# Patient Record
Sex: FEMALE | Race: WHITE | NOT HISPANIC OR LATINO | Employment: FULL TIME | URBAN - METROPOLITAN AREA
[De-identification: names, ages, dates, MRNs, and addresses within clinical notes are randomized per-mention and may not be internally consistent; named-entity substitution may affect disease eponyms.]

---

## 2022-08-23 ENCOUNTER — LAB REQUISITION (OUTPATIENT)
Dept: LAB | Facility: CLINIC | Age: 42
End: 2022-08-23
Payer: COMMERCIAL

## 2022-08-23 DIAGNOSIS — Z11.59 ENCOUNTER FOR SCREENING FOR OTHER VIRAL DISEASES: ICD-10-CM

## 2022-08-23 DIAGNOSIS — Z91.89 OTHER SPECIFIED PERSONAL RISK FACTORS, NOT ELSEWHERE CLASSIFIED: ICD-10-CM

## 2022-08-23 DIAGNOSIS — Z11.4 ENCOUNTER FOR SCREENING FOR HUMAN IMMUNODEFICIENCY VIRUS (HIV): ICD-10-CM

## 2022-08-23 DIAGNOSIS — A53.9 SYPHILIS, UNSPECIFIED: ICD-10-CM

## 2022-08-23 PROCEDURE — 87389 HIV-1 AG W/HIV-1&-2 AB AG IA: CPT | Mod: ORL | Performed by: OBSTETRICS & GYNECOLOGY

## 2022-08-23 PROCEDURE — 87491 CHLMYD TRACH DNA AMP PROBE: CPT | Mod: ORL | Performed by: OBSTETRICS & GYNECOLOGY

## 2022-08-23 PROCEDURE — 86592 SYPHILIS TEST NON-TREP QUAL: CPT | Mod: ORL | Performed by: OBSTETRICS & GYNECOLOGY

## 2022-08-23 PROCEDURE — 87591 N.GONORRHOEAE DNA AMP PROB: CPT | Mod: ORL | Performed by: OBSTETRICS & GYNECOLOGY

## 2022-08-23 PROCEDURE — 87340 HEPATITIS B SURFACE AG IA: CPT | Mod: ORL | Performed by: OBSTETRICS & GYNECOLOGY

## 2022-08-23 PROCEDURE — 86803 HEPATITIS C AB TEST: CPT | Mod: ORL | Performed by: OBSTETRICS & GYNECOLOGY

## 2022-08-24 LAB
C TRACH DNA SPEC QL PROBE+SIG AMP: NEGATIVE
HBV SURFACE AG SERPL QL IA: NONREACTIVE
HCV AB SERPL QL IA: NONREACTIVE
HIV 1+2 AB+HIV1 P24 AG SERPL QL IA: NONREACTIVE
N GONORRHOEA DNA SPEC QL NAA+PROBE: NEGATIVE
RPR SER QL: NONREACTIVE

## 2024-04-22 ENCOUNTER — TRANSCRIBE ORDERS (OUTPATIENT)
Dept: OTHER | Age: 44
End: 2024-04-22

## 2024-04-22 ENCOUNTER — PATIENT OUTREACH (OUTPATIENT)
Dept: ONCOLOGY | Facility: CLINIC | Age: 44
End: 2024-04-22
Payer: COMMERCIAL

## 2024-04-22 DIAGNOSIS — I89.0 LYMPHEDEMA: Primary | ICD-10-CM

## 2024-04-22 NOTE — PROGRESS NOTES
Lilyr received referral for Dr De Paz in the PM&R clinic.     Referred for: lymphovenous anastomosis procedure of her left leg for history of multiple melanomas with chronic lymphedema     Scheduling instructions updated and sent to New Patient Scheduling for completion.

## 2024-06-25 ENCOUNTER — ONCOLOGY VISIT (OUTPATIENT)
Dept: ONCOLOGY | Facility: CLINIC | Age: 44
End: 2024-06-25
Attending: SURGERY
Payer: COMMERCIAL

## 2024-06-25 VITALS
HEART RATE: 69 BPM | RESPIRATION RATE: 16 BRPM | SYSTOLIC BLOOD PRESSURE: 105 MMHG | WEIGHT: 125 LBS | BODY MASS INDEX: 20.83 KG/M2 | HEIGHT: 65 IN | TEMPERATURE: 97.8 F | OXYGEN SATURATION: 99 % | DIASTOLIC BLOOD PRESSURE: 68 MMHG

## 2024-06-25 DIAGNOSIS — I89.0 LYMPHEDEMA: ICD-10-CM

## 2024-06-25 DIAGNOSIS — C43.9 MELANOMA OF SKIN (H): Primary | ICD-10-CM

## 2024-06-25 PROCEDURE — 99205 OFFICE O/P NEW HI 60 MIN: CPT | Performed by: STUDENT IN AN ORGANIZED HEALTH CARE EDUCATION/TRAINING PROGRAM

## 2024-06-25 PROCEDURE — 99213 OFFICE O/P EST LOW 20 MIN: CPT | Performed by: STUDENT IN AN ORGANIZED HEALTH CARE EDUCATION/TRAINING PROGRAM

## 2024-06-25 PROCEDURE — 99417 PROLNG OP E/M EACH 15 MIN: CPT | Performed by: STUDENT IN AN ORGANIZED HEALTH CARE EDUCATION/TRAINING PROGRAM

## 2024-06-25 RX ORDER — MOMETASONE FUROATE 1 MG/ML
SOLUTION TOPICAL
COMMUNITY
Start: 2023-07-12

## 2024-06-25 RX ORDER — LEVONORGESTREL/ETHIN.ESTRADIOL 0.1-0.02MG
1 TABLET ORAL
COMMUNITY
Start: 2023-08-10

## 2024-06-25 RX ORDER — FERROUS SULFATE 7.5 MG/0.5
SYRINGE (EA) ORAL
COMMUNITY

## 2024-06-25 RX ORDER — VITAMIN B COMPLEX
1 CAPSULE ORAL DAILY
COMMUNITY

## 2024-06-25 RX ORDER — ESCITALOPRAM OXALATE 5 MG/1
1 TABLET ORAL DAILY
COMMUNITY
Start: 2022-07-29

## 2024-06-25 RX ORDER — ADAPALENE GEL USP, 0.3% 3 MG/G
GEL TOPICAL 2 TIMES DAILY
COMMUNITY
Start: 2022-12-26

## 2024-06-25 RX ORDER — AZELAIC ACID 0.15 G/G
GEL TOPICAL
COMMUNITY
Start: 2022-12-26

## 2024-06-25 RX ORDER — CLINDAMYCIN PHOSPHATE 10 UG/ML
LOTION TOPICAL
COMMUNITY
Start: 2023-11-16

## 2024-06-25 ASSESSMENT — PAIN SCALES - GENERAL: PAINLEVEL: NO PAIN (0)

## 2024-06-25 NOTE — PATIENT INSTRUCTIONS
It was very nice to meet you today.    1.  An order for a flexitouch lymphedema pump was placed today. We will be in touch as the process progresses.  2.  Proceed with obtaining custom compression garments at your upcoming scheduled orthotics visit with Carmen.  3. Continue working  with lymphedema therapy.  4. Continue daily compression and your home lymphedema exercise routine as tolerated.  5. Follow up with Dr. De Paz in 4-6 months.

## 2024-06-25 NOTE — NURSING NOTE
"Oncology Rooming Note    June 25, 2024 1:04 PM   Milagros Eric is a 43 year old female who presents for:    Chief Complaint   Patient presents with    Oncology Clinic Visit     Initial Vitals: /68   Pulse 69   Temp 97.8  F (36.6  C) (Oral)   Resp 16   Ht 1.651 m (5' 5\")   Wt 56.7 kg (125 lb)   SpO2 99%   BMI 20.80 kg/m   Estimated body mass index is 20.8 kg/m  as calculated from the following:    Height as of this encounter: 1.651 m (5' 5\").    Weight as of this encounter: 56.7 kg (125 lb). Body surface area is 1.61 meters squared.  No Pain (0) Comment: Data Unavailable   No LMP recorded.  Allergies reviewed: Yes  Medications reviewed: Yes    Medications: Medication refills not needed today.  Pharmacy name entered into EPIC:    [id number] - [place], [place] - [address] [facility] AND [address] [id number] - [place], [place] - [address] AT  [address] [facility] [place]    Frailty Screening:   Is the patient here for a new oncology consult visit in cancer care? 2. No      Clinical concerns: follow up        Ann Espinoza            "

## 2024-06-25 NOTE — PROGRESS NOTES
PM&R Clinic Note     Patient Name: Milagros Eric : 1980 Medical Record: [id number]     Requesting Physician/clinician: Moni Sears MD           History of Present Illness:     Milagros Eric is a 43 year old female with history of multiple thin melanomas over bilateral lower extremities since  and left posterior thigh primary melanoma most recently who presents to PM&R cancer rehab clinic for evaluation of her needs in the setting of chronic left lower extremity lymphedema.    Oncology history:  -2013-medial left upper calf/lower knee 0.6 mm thick melanoma-hyperpigmented  - 2019: Right mid lateral calf, MIS  - Followed by Dr. Max Mancilla previously with [facility] and [place].  -Underwent WLE of left posterior thigh primary with negative sentinel lymph node biopsy most recently.  - Was advised in  that she did not meet criteria for adjuvant therapy even when considering later approvals for high risk stage II disease.  - Was continuing with self monitoring and dermatology follow-up with frequent biopsy of suspicious lesions and no subsequent findings of melanoma.  Course was complicated by left lower extremity lymphedema.  - Chronic left lower extremity lymphedema with significant quality of life impairment.  Presently managed with lymphedema pump and daily compression stocking use.  Has consulted with specialist at [place], [facility], [facility] and [facility] regarding surgical intervention including lymph node transplant and lymphovenous anastomosis.  Originally had planned to undergoing lymphovenous anastomosis at [facility].  Studies that were done suggested an element of primary lymphedema in all 4 limbs.  -Had lymphovenous anastomosis of the left leg on 23 at the [facility].  Last follow-up with plastic surgery was virtually on 2024.  Patient reported that they were managing swelling with a 30 to 40 mmHg  flat knit garment, pneumatic compression pump and lymphedema therapy.  She is 6 months out from left leg LVA and reports decreased swelling in submental region and bilateral hands and abdomen and in left leg.  Also reported not refilling as quickly when out of the garment.  Reports that tendons/veins and left foot are more visible.  Currently wearing circular thigh-high compression.  ICG limb for graft feet not directly comparable but showed significant improvement in left lower extremity edema.  Overall doing very well.  Continue with strict compression protocol to maintain optimal containment.  Pneumatic compression pump.  Plan for return to clinic in December 2024.  Plastic surgeon is Dr. Juan Carlos Staton.        Symptoms,  Milagros was seen for an initial evaluation.  Initially swelling developed a few weeks after her left posterior thigh excision in calf. She was seeing lymphedema therapy and wearing knee high compression, then it was stable until 2022 when she had to increase compression to thigh high and increase frequency of lymphedema therapy because of worsened swelling in LLE. In Sept of 2022, she went to a spin class and then in the shower, noticed swelling in her right calf. She wears compression for her RLE.  She has been following with lymphedema therapy.  She does not feel like she has had much physical reduction, though she has had improvement in her left foot and also has noticed improvement in swelling in her face/neck and hands.   She obtains compression garments through [facility] and has circular knit. She needs flat knit garments post op after her LVA at [facility] per surgeon.  She had LVA on foot and leg. She feels the flat knit is pushing on the foot, and she needs to be careful with site.   She has made an appointment with Carmen at [place].   She does not have open wounds in any area of her skin in the body. She is worried about cellulitis, but has not developed it.   In terms of her  lymphedema pump, the surgeon at [facility] suggested a lymphedema pump. She was given a used Carson pump. She had to use an entre pump, and tried for a flexitouch pump and she was denied a pump and the appeal was denied. She obtained a pump from the patient, and it stopped working in trunk and left leg, so she got parts/fixed. She is using 2 used pumps (Carson and flexitouch).       Therapies/HEP,  Continuing with lymphedema therapy, Carolyn Kowalski, through [facility].      Functionally,   Independent with mobility, ADLs and IADLs.             Past Medical and Surgical History:     Past Medical History:   Diagnosis Date    ADHD (attention deficit hyperactivity disorder)     Anxiety     PMS (premenstrual syndrome)     Recurrent UTI     Scoliosis      Past Surgical History:   Procedure Laterality Date    scoliosis surgery  1993            Social History:     Social History     Tobacco Use    Smoking status: Never     Passive exposure: Never    Smokeless tobacco: Never   Substance Use Topics    Alcohol use: Yes     Alcohol/week: 10.0 standard drinks of alcohol     Types: 12 drink(s) per week     Living situation: [place], [place]  Vocational History: Works in finance, corporate real estate, travels frequently for job between [place]  and [facility]         Functional history:     Milagros Eric is independent with all aspects of her life.    ADLs: Independent  Assistive devices: none  iADLs (medication management and finances): Independent           Family History:     History reviewed. No pertinent family history.         Medications:     Current Outpatient Medications   Medication Sig Dispense Refill    cyanocobalamin (B-12) 100 MCG TABS Take 1,000 mcg by mouth daily.      fish oil-omega-3 fatty acids (FISH OIL) 1000 MG capsule Take 2 g by mouth daily.      MULTIPLE VITAMIN PO Take 1 tablet by mouth daily.              Allergies:     No Known Allergies           ROS:     A focused ROS is negative other than the symptoms noted above  "in the HPI.         Physical Examiniation:     VITAL SIGNS: /68   Pulse 69   Temp 97.8  F (36.6  C) (Oral)   Resp 16   Ht 1.651 m (5' 5\")   Wt 56.7 kg (125 lb)   SpO2 99%   BMI 20.80 kg/m    BMI: Estimated body mass index is 20.8 kg/m  as calculated from the following:    Height as of this encounter: 1.651 m (5' 5\").    Weight as of this encounter: 56.7 kg (125 lb).    Gen: NAD, pleasant and cooperative  HEENT: Normocephalic, atraumatic, extra-ocular movements appear intact  Pulm: non-labored breathing in room air  Ext: no edema in BLE, no tenderness in calves  Neuro/MSK:   Orientation: Oriented to person, place, time, situation. Exhibits good insight into his/her condition and ongoing management/symptoms.  Motor: Moving all 4 extremities actively against gravity.  Bilateral lower extremity lymphedema exam:   Notable palpable lymphedema on inspection in bilateral lower extremities L>R, positive Stemmer's sign bilaterally. Surgical scars well healed along left lower extremity.           Laboratory/Imaging:     MA Screening Bilateral W/ [facility] (4/22/24):  FINDINGS: Bilateral screening mammogram was performed with the assistance   of Computer-Aided Detection and breast tomosynthesis. The breasts are   extremely dense, which lowers the sensitivity of mammography.            Assessment/Plan:   Milagros Eric is a 43 year old female with history of multiple thin melanomas over bilateral lower extremities since 2013 and left posterior thigh primary melanoma most recently who presents to PM&R cancer rehab clinic for evaluation of her needs in the setting of chronic left lower extremity lymphedema.  Multiple rehabilitation considerations were discussed with Milagros at today's visit. She is here to establish care for ongoing lymphedema management. We reviewed patient education surrounding lymphedema management in the setting of her history. She would strongly benefit from a flexitouch lymphedema pump for ongoing " management of her bilateral lower extremity lymphedema. Order was placed today. She should proceed to orthotics as planned for fitting for custom compression garments. Patient is having an area of exacerbated swelling/pressure with her current garments which are close to one of her surgical sites and would benefit from new custom compression. She should continue her home regimen and working with lymphedema therapy. We will plan a return visit in 4-6 months. She is in agreement with this plan.      Patient education: In depth discussion and education was provided about the assessment and implications of each of the below recommendations for management. Patient indicated readiness to learn, all questions were answered and understanding of material presented was confirmed.  Therapy/equipment/braces:  Continue working with lymphedema therapy for ongoing management.  Continue with daily compression and keep scheduled appointment for custom compression fitting.    Patient would benefit from a lymphedema pump, so order was placed at today's visit. Milagros Wiseman a patient who I have seen for bilateral lower extremity swelling due to secondary lymphedema.  They have had aggressive treatment including education, elevation, medications, exercise, therapy, massage, bandaging and compression garments. Milagros Marcanos been compliant with the program for swelling, but despite this continues to have problems with continued swelling, scarring, fibrosis, hyperkeratosis, hyperplasia and decreased functional mobility. A trial of a basic / pump has been done without significant benefit.  It is my medical opinion an advanced lymphatic pump is required. Order placed for flexitouch lymphedema pump. I am placing an order for a Flexitouch lymphatic pump.   Referral / follow up with other providers:  Continue follow up with plastic surgery team.  Follow up: 4-6 months.    Samira De Paz MD  Physical Medicine &  Rehabilitation    I appreciate the opportunity to participate in the care of your patient.     90 minutes spent on the date of the encounter doing chart review, history and exam, documentation and further activities as noted above.

## 2024-06-25 NOTE — LETTER
2024      Milagros Eric  [address]  [facility] [address]      Dear Colleague,    Thank you for referring your patient, Milagros Eric, to the [facility]. Please see a copy of my visit note below.           PM&R Clinic Note     Patient Name: Milagros Eric : 1980 Medical Record: [id number]     Requesting Physician/clinician: Moni Sears MD           History of Present Illness:     Milagros Eric is a 43 year old female with history of multiple thin melanomas over bilateral lower extremities since  and left posterior thigh primary melanoma most recently who presents to PM&R cancer rehab clinic for evaluation of her needs in the setting of chronic left lower extremity lymphedema.    Oncology history:  -2013-medial left upper calf/lower knee 0.6 mm thick melanoma-hyperpigmented  - 2019: Right mid lateral calf, MIS  - Followed by Dr. Max Mancilla previously with [facility] and [place].  -Underwent WLE of left posterior thigh primary with negative sentinel lymph node biopsy most recently.  - Was advised in  that she did not meet criteria for adjuvant therapy even when considering later approvals for high risk stage II disease.  - Was continuing with self monitoring and dermatology follow-up with frequent biopsy of suspicious lesions and no subsequent findings of melanoma.  Course was complicated by left lower extremity lymphedema.  - Chronic left lower extremity lymphedema with significant quality of life impairment.  Presently managed with lymphedema pump and daily compression stocking use.  Has consulted with specialist at [place], [facility] including lymph node transplant and lymphovenous anastomosis.  Originally had planned to undergoing lymphovenous anastomosis at [facility].  Studies that were done suggested an element of  primary lymphedema in all 4 limbs.  -Had lymphovenous anastomosis of the left leg on 12/27/23 at the [facility].  Last follow-up with plastic surgery was virtually on 6/11/2024.  Patient reported that they were managing swelling with a 30 to 40 mmHg flat knit garment, pneumatic compression pump and lymphedema therapy.  She is 6 months out from left leg LVA and reports decreased swelling in submental region and bilateral hands and abdomen and in left leg.  Also reported not refilling as quickly when out of the garment.  Reports that tendons/veins and left foot are more visible.  Currently wearing circular thigh-high compression.  ICG limb for graft feet not directly comparable but showed significant improvement in left lower extremity edema.  Overall doing very well.  Continue with strict compression protocol to maintain optimal containment.  Pneumatic compression pump.  Plan for return to clinic in December 2024.  Plastic surgeon is Dr. Juan Carlos Staton.        Milagros Saini was seen for an initial evaluation.  Initially swelling developed a few weeks after her left posterior thigh excision in calf. She was seeing lymphedema therapy and wearing knee high compression, then it was stable until 2022 when she had to increase compression to thigh high and increase frequency of lymphedema therapy because of worsened swelling in LLE. In Sept of 2022, she went to a spin class and then in the shower, noticed swelling in her right calf. She wears compression for her RLE.  She has been following with lymphedema therapy.  She does not feel like she has had much physical reduction, though she has had improvement in her left foot and also has noticed improvement in swelling in her face/neck and hands.   She obtains compression garments through [facility] and has circular knit. She needs flat knit garments post op after her LVA at [facility] per surgeon.  She had LVA on foot and leg. She feels the flat knit is pushing  on the foot, and she needs to be careful with site.   She has made an appointment with Carmen at [place].   She does not have open wounds in any area of her skin in the body. She is worried about cellulitis, but has not developed it.   In terms of her lymphedema pump, the surgeon at [facility] suggested a lymphedema pump. She was given a used Carson pump. She had to use an entre pump, and tried for a flexitouch pump and she was denied a pump and the appeal was denied. She obtained a pump from the patient, and it stopped working in trunk and left leg, so she got parts/fixed. She is using 2 used pumps (Carson and flexitouch).       Therapies/HEP,  Continuing with lymphedema therapy, Carolyn Kowalski, through [facility].      Functionally,   Independent with mobility, ADLs and IADLs.             Past Medical and Surgical History:     Past Medical History:   Diagnosis Date     ADHD (attention deficit hyperactivity disorder)      Anxiety      PMS (premenstrual syndrome)      Recurrent UTI      Scoliosis      Past Surgical History:   Procedure Laterality Date     scoliosis surgery  1993            Social History:     Social History     Tobacco Use     Smoking status: Never     Passive exposure: Never     Smokeless tobacco: Never   Substance Use Topics     Alcohol use: Yes     Alcohol/week: 10.0 standard drinks of alcohol     Types: 12 drink(s) per week     Living situation: [place], [place]  Vocational History: Works in finance, corporate real estate, 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    ADLs: Independent  Assistive devices: none  iADLs (medication management and finances): Independent           Family History:     History reviewed. No pertinent family history.         Medications:     Current Outpatient Medications   Medication Sig Dispense Refill     cyanocobalamin (B-12) 100 MCG TABS Take 1,000 mcg by mouth daily.        "fish oil-omega-3 fatty acids (FISH OIL) 1000 MG capsule Take 2 g by mouth daily.       MULTIPLE VITAMIN PO Take 1 tablet by mouth daily.              Allergies:     No Known Allergies           ROS:     A focused ROS is negative other than the symptoms noted above in the HPI.         Physical Examiniation:     VITAL SIGNS: /68   Pulse 69   Temp 97.8  F (36.6  C) (Oral)   Resp 16   Ht 1.651 m (5' 5\")   Wt 56.7 kg (125 lb)   SpO2 99%   BMI 20.80 kg/m    BMI: Estimated body mass index is 20.8 kg/m  as calculated from the following:    Height as of this encounter: 1.651 m (5' 5\").    Weight as of this encounter: 56.7 kg (125 lb).    Gen: NAD, pleasant and cooperative  HEENT: Normocephalic, atraumatic, extra-ocular movements appear intact  Pulm: non-labored breathing in room air  Ext: no edema in BLE, no tenderness in calves  Neuro/MSK:   Orientation: Oriented to person, place, time, situation. Exhibits good insight into his/her condition and ongoing management/symptoms.  Motor: Moving all 4 extremities actively against gravity.  Bilateral lower extremity lymphedema exam:   Notable palpable lymphedema on inspection in bilateral lower extremities L>R, positive Stemmer's sign bilaterally. Surgical scars well healed along left lower extremity.           Laboratory/Imaging:     MA Screening Bilateral W/ Zachary (4/22/24):  FINDINGS: Bilateral screening mammogram was performed with the assistance   of Computer-Aided Detection and breast tomosynthesis. The breasts are   extremely dense, which lowers the sensitivity of mammography.            Assessment/Plan:   Milagros Eric is a 43 year old female with history of multiple thin melanomas over bilateral lower extremities since 2013 and left posterior thigh primary melanoma most recently who presents to PM&R cancer rehab clinic for evaluation of her needs in the setting of chronic left lower extremity lymphedema.  Multiple rehabilitation considerations were discussed " with Milagros at today's visit. She is here to establish care for ongoing lymphedema management. We reviewed patient education surrounding lymphedema management in the setting of her history. She would strongly benefit from a flexitouch lymphedema pump for ongoing management of her bilateral lower extremity lymphedema. Order was placed today. She should proceed to orthotics as planned for fitting for custom compression garments. Patient is having an area of exacerbated swelling/pressure with her current garments which are close to one of her surgical sites and would benefit from new custom compression. She should continue her home regimen and working with lymphedema therapy. We will plan a return visit in 4-6 months. She is in agreement with this plan.      Patient education: In depth discussion and education was provided about the assessment and implications of each of the below recommendations for management. Patient indicated readiness to learn, all questions were answered and understanding of material presented was confirmed.  Therapy/equipment/braces:  Continue working with lymphedema therapy for ongoing management.  Continue with daily compression and keep scheduled appointment for custom compression fitting.    Patient would benefit from a lymphedema pump, so order was placed at today's visit. Milagrosmarj Ericis a patient who I have seen for bilateral lower extremity swelling due to secondary lymphedema.  They have had aggressive treatment including education, elevation, medications, exercise, therapy, massage, bandaging and compression garments. Milagros K Trista been compliant with the program for swelling, but despite this continues to have problems with continued swelling, scarring, fibrosis and decreased functional mobility.  A trial of a basic / pump has been done without significant benefit.  It is my medical opinion an advanced lymphatic pump is required. Order placed for flexitouch  lymphedema pump. I am placing an order for a Flexitouch lymphatic pump.   Referral / follow up with other providers:  Continue follow up with plastic surgery team.  Follow up: 4-6 months.    Samira De Paz MD  Physical Medicine & Rehabilitation    I appreciate the opportunity to participate in the care of your patient.     90 minutes spent on the date of the encounter doing chart review, history and exam, documentation and further activities as noted above.               Again, thank you for allowing me to participate in the care of your patient.        Sincerely,        Samira De Paz MD

## 2024-07-09 ENCOUNTER — MEDICAL CORRESPONDENCE (OUTPATIENT)
Dept: HEALTH INFORMATION MANAGEMENT | Facility: CLINIC | Age: 44
End: 2024-07-09
Payer: COMMERCIAL

## 2024-10-15 ENCOUNTER — LAB REQUISITION (OUTPATIENT)
Dept: LAB | Facility: CLINIC | Age: 44
End: 2024-10-15
Payer: COMMERCIAL

## 2024-10-15 DIAGNOSIS — Z11.3 ENCOUNTER FOR SCREENING FOR INFECTIONS WITH A PREDOMINANTLY SEXUAL MODE OF TRANSMISSION: ICD-10-CM

## 2024-10-15 PROCEDURE — 87491 CHLMYD TRACH DNA AMP PROBE: CPT | Mod: ORL | Performed by: OBSTETRICS & GYNECOLOGY

## 2024-10-16 LAB
C TRACH DNA SPEC QL PROBE+SIG AMP: NEGATIVE
N GONORRHOEA DNA SPEC QL NAA+PROBE: NEGATIVE

## 2024-10-29 ENCOUNTER — ONCOLOGY VISIT (OUTPATIENT)
Dept: ONCOLOGY | Facility: CLINIC | Age: 44
End: 2024-10-29
Attending: STUDENT IN AN ORGANIZED HEALTH CARE EDUCATION/TRAINING PROGRAM
Payer: COMMERCIAL

## 2024-10-29 VITALS
DIASTOLIC BLOOD PRESSURE: 68 MMHG | RESPIRATION RATE: 16 BRPM | WEIGHT: 123.4 LBS | SYSTOLIC BLOOD PRESSURE: 101 MMHG | HEIGHT: 65 IN | BODY MASS INDEX: 20.56 KG/M2 | OXYGEN SATURATION: 99 % | HEART RATE: 91 BPM

## 2024-10-29 DIAGNOSIS — C43.9 MELANOMA OF SKIN (H): Primary | ICD-10-CM

## 2024-10-29 DIAGNOSIS — I89.0 LYMPHEDEMA: ICD-10-CM

## 2024-10-29 DIAGNOSIS — M21.612 BUNION, LEFT: ICD-10-CM

## 2024-10-29 PROCEDURE — 99215 OFFICE O/P EST HI 40 MIN: CPT | Performed by: STUDENT IN AN ORGANIZED HEALTH CARE EDUCATION/TRAINING PROGRAM

## 2024-10-29 PROCEDURE — 99213 OFFICE O/P EST LOW 20 MIN: CPT | Performed by: STUDENT IN AN ORGANIZED HEALTH CARE EDUCATION/TRAINING PROGRAM

## 2024-10-29 ASSESSMENT — PAIN SCALES - GENERAL: PAINLEVEL_OUTOF10: NO PAIN (0)

## 2024-10-29 NOTE — PROGRESS NOTES
[facility]   PM&R clinic note        Interval history:     Milagros Eric presents to clinic today for follow up reg her rehab needs.   She has h/o multiple thin melanomas over bilateral lower extremities since 2013 and left posterior thigh primary melanoma with chronic left lower extremity lymphedema.  Was last seen in clinic on 6/25/24.  Recommendations included:  Therapy/equipment/braces:  Continue working with lymphedema therapy for ongoing management.  Continue with daily compression and keep scheduled appointment for custom compression fitting.    Patient would benefit from a lymphedema pump, so order was placed at today's visit. Milagros Ericis a patient who I have seen for bilateral lower extremity swelling due to secondary lymphedema.  They have had aggressive treatment including education, elevation, medications, exercise, therapy, massage, bandaging and compression garments. Milagros Erichas been compliant with the program for swelling, but despite this continues to have problems with continued swelling, scarring, fibrosis, hyperkeratosis, hyperplasia and decreased functional mobility. A trial of a basic / pump has been done without significant benefit.  It is my medical opinion an advanced lymphatic pump is required. Order placed for flexitouch lymphedema pump. I am placing an order for a Flexitouch lymphatic pump.   Referral / follow up with other providers:  Continue follow up with plastic surgery team.  Follow up: 4-6 months.    Oncology history:  -4/20/2013-medial left upper calf/lower knee 0.6 mm thick melanoma-hyperpigmented  - 1/23/2019: Right mid lateral calf, MIS  - Followed by Dr. Max Mancilla previously with [facility] and [place].  -Underwent WLE of left posterior thigh primary with negative sentinel lymph node biopsy most recently.  - Was advised in 2021 that she did not meet criteria for adjuvant  therapy even when considering later approvals for high risk stage II disease.  - Was continuing with self monitoring and dermatology follow-up with frequent biopsy of suspicious lesions and no subsequent findings of melanoma.  Course was complicated by left lower extremity lymphedema.  - Chronic left lower extremity lymphedema with significant quality of life impairment.  Presently managed with lymphedema pump and daily compression stocking use.  Has consulted with specialist at [place], [facility], [facility] and [facility] regarding surgical intervention including lymph node transplant and lymphovenous anastomosis.  Originally had planned to undergoing lymphovenous anastomosis at [facility].  Studies that were done suggested an element of primary lymphedema in all 4 limbs.  -Had lymphovenous anastomosis of the left leg on 12/27/23 at the [facility].  Last follow-up with plastic surgery was virtually on 6/11/2024.  Patient reported that they were managing swelling with a 30 to 40 mmHg flat knit garment, pneumatic compression pump and lymphedema therapy.  She is 6 months out from left leg LVA and reports decreased swelling in submental region and bilateral hands and abdomen and in left leg.  Also reported not refilling as quickly when out of the garment.  Reports that tendons/veins and left foot are more visible.  Currently wearing circular thigh-high compression.  ICG limb for graft feet not directly comparable but showed significant improvement in left lower extremity edema.  Overall doing very well.  Continue with strict compression protocol to maintain optimal containment.  Pneumatic compression pump.  Plan for return to clinic in December 2024.  Plastic surgeon is Dr. Juan Carlos Staton.      Parveen  Milagros was seen for a return visit today.  Overall, she is doing pretty well today at the visit in regards to her left lower extremity lymphedema.  She has finally received her tactile Flexitouch pump,  and has been using it for the last weeks and feels like it has been really helping with her swelling.  She continues to wear her thigh-high compression daily on her left lower extremity and was able to work with orthotics to get her toe Adjusted very well.  She is however struggling with a bunion near her great toe, and had previously seen a podiatrist a few years ago who said she may need surgery.  She has not been back to see podiatry.  However, with the compression she feels like that bunion has been aggravated.  She has a follow-up with her surgeon coming up before the end of the year, and will discuss her progress and any future procedures needed.  Her right lower extremity has been stable, without any exacerbated swelling.  She has contemplated whether she needs anything procedurally done on that right leg, and will discuss this with plastic surgery.  She has also noticed some increased arm swelling, especially in the fingers at times.  She continues working with lymphedema therapy.  Will be traveling to [facility] for the holidays.    Therapies/HEP,  Continues lymphedema therapy and her home regimen as tolerated.      Functionally,   No changes.      Social history is unchanged.        Medications:  Current Outpatient Medications   Medication Sig Dispense Refill    ACIDOPHILUS LACTOBACILLUS PO Take 1 capsule by mouth daily      adapalene (DIFFERIN) 0.3 % external gel Apply topically 2 times daily      azelaic acid (FINACIA) 15 % external gel APPLY TOPICALLY TO FACE EVERY DAY      Calcium-Magnesium-Vitamin D (CALCIUM MAGNESIUM PO) Take by mouth daily      clindamycin (CLEOCIN T) 1 % external lotion APPLY TOPICALLY TO FACE 1 TO 2 TIMES DAILY      cyanocobalamin (B-12) 100 MCG TABS Take 1,000 mcg by mouth daily.      escitalopram (LEXAPRO) 5 MG tablet Take 1 tablet by mouth daily      ferrous sulfate (SUMMER-IN-SOL) 75 (15 FE) MG/ML oral drops Take by mouth.      fish oil-omega-3 fatty acids (FISH OIL)  "1000 MG capsule Take 2 g by mouth daily.      levonorgestrel-ethinyl estradiol (AVIANE) 0.1-20 MG-MCG tablet Take 1 tablet by mouth      mometasone (ELOCON) 0.1 % external solution Apply BID PRN to bug bites.  1 week MAX      MULTIPLE VITAMIN PO Take 1 tablet by mouth daily.      vitamin (B COMPLEX) capsule Take 1 capsule by mouth daily                Physical Exam:   /68   Pulse 91   Resp 16   Ht 1.651 m (5' 5\")   Wt 56 kg (123 lb 6.4 oz)   SpO2 99%   BMI 20.53 kg/m    Gen: NAD, pleasant and cooperative  HEENT: Normocephalic, atraumatic, extra-ocular movements appear intact  Pulm: non-labored breathing in room air  Ext: no edema in BLE, no tenderness in calves  Neuro/MSK:   Orientation: Oriented to person, place, time, situation. Exhibits good insight into her condition and ongoing management/symptoms.  Motor: Moving all 4 extremities actively against gravity.  Bilateral lower extremity lymphedema exam:   Notable palpable lymphedema on inspection in bilateral lower extremities L>R, positive Stemmer's sign bilaterally. Surgical scars well healed along left lower extremity.  Edema appears well-controlled in left lower extremity, no exacerbation since last visit.  Bunion noted medially at MTP of great toe which is red, appears irritated and inflamed.    Labs/Imaging:  No results found for: \"WBC\", \"HGB\", \"HCT\", \"MCV\", \"PLT\"  No results found for: \"NA\", \"POTASSIUM\", \"CHLORIDE\", \"CO2\", \"GLC\"  No results found for: \"GFRESTIMATED\", \"GFRESTBLACK\"  No results found for: \"AST\", \"ALT\", \"GGT\", \"ALKPHOS\", \"BILITOTAL\", \"BILICONJ\", \"BILIDIRECT\", \"KATRIN\"  No results found for: \"INR\"  No results found for: \"BUN\", \"CR\"           Assessment/Plan   Milagros Eric presents to clinic today for follow up reg her rehab needs.   She has h/o multiple thin melanomas over bilateral lower extremities since 2013 and left posterior thigh primary melanoma with chronic left lower extremity lymphedema.  Was last seen in clinic on " 6/25/24.  Multiple rehabilitation considerations were discussed with Milagros at today's visit.  Overall, her left lower extremity lymphedema is stable and she should continue daily compression as well as use of her lymphedema pump as it has been helping.  She should follow with her plastic surgeon as planned.  We discussed the bunion on her left foot and on inspection it does look irritated and inflamed, so a podiatry referral was placed today for further evaluation and discussion of management options for this.  We will plan a return visit in 6 to 9 months.  She is in agreement with this plan.      Therapy/equipment/braces:  Continue lymphedema therapy.  Continue daily lymphedema regimen including stretches, compression and massage as needed.  Continue daily lymphedema pump.  Referral / follow up with other providers:  Follow-up with plastic surgeon as planned.  Referral to podiatry placed today for bunion.  Follow up: 6 to 9 months.      Samira De Paz MD  Physical Medicine & Rehabilitation      50 minutes spent on the date of the encounter doing chart review, history and exam, documentation and further activities as noted above.

## 2024-10-29 NOTE — PATIENT INSTRUCTIONS
It was nice to see you again today.    1.  Continue lymphedema therapy.  2.  Continue your home lymphedema regimen including compression and exercises.  3.  Continue use of your lymphedema pump.  4.  A podiatry referral was placed to evaluate your bunion and discussed management options.  Dr. Walsh and Dr. Garcia are the 2 physicians in our system.  5.  Follow-up with your plastic surgeon as planned.  6.  Follow-up with Dr. De Paz in 6 to 9 months.

## 2024-10-29 NOTE — PROGRESS NOTES
"Oncology Rooming Note    October 29, 2024 11:32 AM   Milagros Eric is a 44 year old female who presents for:    Chief Complaint   Patient presents with    Oncology Clinic Visit     Initial Vitals: There were no vitals taken for this visit. Estimated body mass index is 20.8 kg/m  as calculated from the following:    Height as of 6/25/24: 1.651 m (5' 5\").    Weight as of 6/25/24: 56.7 kg (125 lb). There is no height or weight on file to calculate BSA.  Data Unavailable Comment: Data Unavailable   No LMP recorded.  Allergies reviewed: Yes  Medications reviewed: Yes    Medications: Medication refills not needed today.  Pharmacy name entered into [facility]:    [facility] #[id number] - [place], [place] - [address] [facility] #[id number] - [place], [place] - [address] AT  [address] [facility]    Frailty Screening:   Is the patient here for a new oncology consult visit in cancer care? 2. No          Polina Nuñez MA            "

## 2024-10-29 NOTE — LETTER
10/29/2024      Milagros Eric  [address]  [place] [address]      Dear Colleague,    Thank you for referring your patient, Milagros Eric, to the [facility]. Please see a copy of my visit note below.    General acute hospital   PM&R clinic note        Interval history:     Milagros Eric presents to clinic today for follow up reg her rehab needs.   She has h/o multiple thin melanomas over bilateral lower extremities since 2013 and left posterior thigh primary melanoma with chronic left lower extremity lymphedema.  Was last seen in clinic on 6/25/24.  Recommendations included:  Therapy/equipment/braces:  Continue working with lymphedema therapy for ongoing management.  Continue with daily compression and keep scheduled appointment for custom compression fitting.    Patient would benefit from a lymphedema pump, so order was placed at today's visit. Milagros Ericis a patient who I have seen for bilateral lower extremity swelling due to secondary lymphedema.  They have had aggressive treatment including education, elevation, medications, exercise, therapy, massage, bandaging and compression garments. Milagros Erichas been compliant with the program for swelling, but despite this continues to have problems with continued swelling, scarring, fibrosis, hyperkeratosis, hyperplasia and decreased functional mobility. A trial of a basic / pump has been done without significant benefit.  It is my medical opinion an advanced lymphatic pump is required. Order placed for flexitouch lymphedema pump. I am placing an order for a Flexitouch lymphatic pump.   Referral / follow up with other providers:  Continue follow up with plastic surgery team.  Follow up: 4-6 months.    Oncology history:  -4/20/2013-medial left upper calf/lower knee 0.6 mm thick melanoma-hyperpigmented  - 1/23/2019: Right mid lateral calf, MIS  - Followed by Dr. Max CHACKO  Charo previously with [facility].  -Underwent WLE of left posterior thigh primary with negative sentinel lymph node biopsy most recently.  - Was advised in 2021 that she did not meet criteria for adjuvant therapy even when considering later approvals for high risk stage II disease.  - Was continuing with self monitoring and dermatology follow-up with frequent biopsy of suspicious lesions and no subsequent findings of melanoma.  Course was complicated by left lower extremity lymphedema.  - Chronic left lower extremity lymphedema with significant quality of life impairment.  Presently managed with lymphedema pump and daily compression stocking use.  Has consulted with specialist at [place], [facility], [facility] regarding surgical intervention including lymph node transplant and lymphovenous anastomosis.  Originally had planned to undergoing lymphovenous anastomosis at [facility].  Studies that were done suggested an element of primary lymphedema in all 4 limbs.  -Had lymphovenous anastomosis of the left leg on 12/27/23 at the [facility].  Last follow-up with plastic surgery was virtually on 6/11/2024.  Patient reported that they were managing swelling with a 30 to 40 mmHg flat knit garment, pneumatic compression pump and lymphedema therapy.  She is 6 months out from left leg LVA and reports decreased swelling in submental region and bilateral hands and abdomen and in left leg.  Also reported not refilling as quickly when out of the garment.  Reports that tendons/veins and left foot are more visible.  Currently wearing circular thigh-high compression.  ICG limb for graft feet not directly comparable but showed significant improvement in left lower extremity edema.  Overall doing very well.  Continue with strict compression protocol to maintain optimal containment.  Pneumatic compression pump.  Plan for return to clinic in December 2024.   Plastic surgeon is Dr. Juan Carlos Staton.      Symptoms,  Milagros was seen for a return visit today.  Overall, she is doing pretty well today at the visit in regards to her left lower extremity lymphedema.  She has finally received her tactile Flexitouch pump, and has been using it for the last weeks and feels like it has been really helping with her swelling.  She continues to wear her thigh-high compression daily on her left lower extremity and was able to work with orthotics to get her toe Adjusted very well.  She is however struggling with a bunion near her great toe, and had previously seen a podiatrist a few years ago who said she may need surgery.  She has not been back to see podiatry.  However, with the compression she feels like that bunion has been aggravated.  She has a follow-up with her surgeon coming up before the end of the year, and will discuss her progress and any future procedures needed.  Her right lower extremity has been stable, without any exacerbated swelling.  She has contemplated whether she needs anything procedurally done on that right leg, and will discuss this with plastic surgery.  She has also noticed some increased arm swelling, especially in the fingers at times.  She continues working with lymphedema therapy.  Will be traveling to [facility] for the holidays.    Therapies/HEP,  Continues lymphedema therapy and her home regimen as tolerated.      Functionally,   No changes.      Social history is unchanged.        Medications:  Current Outpatient Medications   Medication Sig Dispense Refill     ACIDOPHILUS LACTOBACILLUS PO Take 1 capsule by mouth daily       adapalene (DIFFERIN) 0.3 % external gel Apply topically 2 times daily       azelaic acid (FINACIA) 15 % external gel APPLY TOPICALLY TO FACE EVERY DAY       Calcium-Magnesium-Vitamin D (CALCIUM MAGNESIUM PO) Take by mouth daily       clindamycin (CLEOCIN T) 1 % external lotion APPLY TOPICALLY TO FACE 1 TO 2 TIMES DAILY        "cyanocobalamin (B-12) 100 MCG TABS Take 1,000 mcg by mouth daily.       escitalopram (LEXAPRO) 5 MG tablet Take 1 tablet by mouth daily       ferrous sulfate (SUMMER-IN-SOL) 75 (15 FE) MG/ML oral drops Take by mouth.       fish oil-omega-3 fatty acids (FISH OIL) 1000 MG capsule Take 2 g by mouth daily.       levonorgestrel-ethinyl estradiol (AVIANE) 0.1-20 MG-MCG tablet Take 1 tablet by mouth       mometasone (ELOCON) 0.1 % external solution Apply BID PRN to bug bites.  1 week MAX       MULTIPLE VITAMIN PO Take 1 tablet by mouth daily.       vitamin (B COMPLEX) capsule Take 1 capsule by mouth daily                Physical Exam:   /68   Pulse 91   Resp 16   Ht 1.651 m (5' 5\")   Wt 56 kg (123 lb 6.4 oz)   SpO2 99%   BMI 20.53 kg/m    Gen: NAD, pleasant and cooperative  HEENT: Normocephalic, atraumatic, extra-ocular movements appear intact  Pulm: non-labored breathing in room air  Ext: no edema in BLE, no tenderness in calves  Neuro/MSK:   Orientation: Oriented to person, place, time, situation. Exhibits good insight into her condition and ongoing management/symptoms.  Motor: Moving all 4 extremities actively against gravity.  Bilateral lower extremity lymphedema exam:   Notable palpable lymphedema on inspection in bilateral lower extremities L>R, positive Stemmer's sign bilaterally. Surgical scars well healed along left lower extremity.  Edema appears well-controlled in left lower extremity, no exacerbation since last visit.  Bunion noted medially at MTP of great toe which is red, appears irritated and inflamed.    Labs/Imaging:  No results found for: \"WBC\", \"HGB\", \"HCT\", \"MCV\", \"PLT\"  No results found for: \"NA\", \"POTASSIUM\", \"CHLORIDE\", \"CO2\", \"GLC\"  No results found for: \"GFRESTIMATED\", \"GFRESTBLACK\"  No results found for: \"AST\", \"ALT\", \"GGT\", \"ALKPHOS\", \"BILITOTAL\", \"BILICONJ\", \"BILIDIRECT\", \"KATRIN\"  No results found for: \"INR\"  No results found for: \"BUN\", \"CR\"           Assessment/Plan   Milagros PORTILLO Eric " "presents to clinic today for follow up reg her rehab needs.   She has h/o multiple thin melanomas over bilateral lower extremities since 2013 and left posterior thigh primary melanoma with chronic left lower extremity lymphedema.  Was last seen in clinic on 6/25/24.  Multiple rehabilitation considerations were discussed with Milagros at today's visit.  Overall, her left lower extremity lymphedema is stable and she should continue daily compression as well as use of her lymphedema pump as it has been helping.  She should follow with her plastic surgeon as planned.  We discussed the bunion on her left foot and on inspection it does look irritated and inflamed, so a podiatry referral was placed today for further evaluation and discussion of management options for this.  We will plan a return visit in 6 to 9 months.  She is in agreement with this plan.      Therapy/equipment/braces:  Continue lymphedema therapy.  Continue daily lymphedema regimen including stretches, compression and massage as needed.  Continue daily lymphedema pump.  Referral / follow up with other providers:  Follow-up with plastic surgeon as planned.  Referral to podiatry placed today for bunion.  Follow up: 6 to 9 months.      Samira De Paz MD  Physical Medicine & Rehabilitation      50 minutes spent on the date of the encounter doing chart review, history and exam, documentation and further activities as noted above.             Oncology Rooming Note    October 29, 2024 11:32 AM   Milagros Eric is a 44 year old female who presents for:    Chief Complaint   Patient presents with     Oncology Clinic Visit     Initial Vitals: There were no vitals taken for this visit. Estimated body mass index is 20.8 kg/m  as calculated from the following:    Height as of 6/25/24: 1.651 m (5' 5\").    Weight as of 6/25/24: 56.7 kg (125 lb). There is no height or weight on file to calculate BSA.  Data Unavailable Comment: Data Unavailable   No LMP " recorded.  Allergies reviewed: Yes  Medications reviewed: Yes    Medications: Medication refills not needed today.  Pharmacy name entered into [facility]:    [facility] #[id number] - [place], [place] - [address] [facility] [address] [facility] #[id number] - [place], [place] - [address] AT [facility]/[address] [place] [address]    Frailty Screening:   Is the patient here for a new oncology consult visit in cancer care? 2. No          Polina Nuñez MA              Again, thank you for allowing me to participate in the care of your patient.        Sincerely,        Samira De Paz MD

## 2024-10-30 ENCOUNTER — PATIENT OUTREACH (OUTPATIENT)
Dept: CARE COORDINATION | Facility: CLINIC | Age: 44
End: 2024-10-30
Payer: COMMERCIAL

## 2024-11-01 ENCOUNTER — PATIENT OUTREACH (OUTPATIENT)
Dept: CARE COORDINATION | Facility: CLINIC | Age: 44
End: 2024-11-01
Payer: COMMERCIAL

## 2024-11-05 ENCOUNTER — TELEPHONE (OUTPATIENT)
Dept: PODIATRY | Facility: CLINIC | Age: 44
End: 2024-11-05
Payer: COMMERCIAL

## 2024-11-05 NOTE — TELEPHONE ENCOUNTER
Phone call to patient to clarify if she is wanting to be seen for her bunion of the left foot. She states she does and that she is not a candidate for surgery at this time due to lymphedema treatment. Patient had lymphovenous anastomosis of the left leg on 12/27/23 at the [facility]. She sees her Oncologist for previous melanoma treatment.      She states she doesn't need to see a surgical podiatrist. Explained that all of our podiatrists are both surgical and non surgical. She inquires if Dr. Damian has worked with patients with lymphedema before. She states she has to be very careful with compression stockings, elevating her feet etc. Will discuss with provider and get back with her if a different provider is recommended.   She verbalized understanding and was appreciative of the call.     Please advise if you are ok seeing this patient.     ILANA Pack RN

## 2024-11-05 NOTE — TELEPHONE ENCOUNTER
Other: Pt is being referred for inflammation, recent lymphedema surgery, and bunion issues.  Can the team please review this to ensure Dr. Damian is a good fit for the non surgical management of this pt's case? Please Contact [facility] or Pt to reschedule if no appropriate.  She is still healing from the lymphedema surgery and not a candidate for the bunion surgery at this time.  She indicated that the compression socks aggravate the bunion.     Could we send this information to you in [employer] or would you prefer to receive a phone call?:   Patient would prefer a phone call   Okay to leave a detailed message?: Yes at Cell number on file:    Telephone Information:   Mobile [phone]

## 2024-11-06 NOTE — TELEPHONE ENCOUNTER
I am happy to see her.   I have worked with lymphedema patients, yet don't treat lymphedema.   Conservative recommendations for the bunion will be similar to what I recommend for everyone.   Lymphedema might limit what I can offer.    Dr. Damian

## 2024-12-04 ENCOUNTER — OFFICE VISIT (OUTPATIENT)
Dept: PODIATRY | Facility: CLINIC | Age: 44
End: 2024-12-04
Attending: STUDENT IN AN ORGANIZED HEALTH CARE EDUCATION/TRAINING PROGRAM
Payer: COMMERCIAL

## 2024-12-04 VITALS — WEIGHT: 123 LBS | DIASTOLIC BLOOD PRESSURE: 62 MMHG | BODY MASS INDEX: 20.47 KG/M2 | SYSTOLIC BLOOD PRESSURE: 102 MMHG

## 2024-12-04 DIAGNOSIS — M21.612 BUNION, LEFT: ICD-10-CM

## 2024-12-04 DIAGNOSIS — C43.9 MELANOMA OF SKIN (H): ICD-10-CM

## 2024-12-04 DIAGNOSIS — M21.42 PES PLANUS OF BOTH FEET: ICD-10-CM

## 2024-12-04 DIAGNOSIS — I89.0 LYMPHEDEMA: ICD-10-CM

## 2024-12-04 DIAGNOSIS — M20.11 HALLUX ABDUCTOVALGUS, RIGHT: Primary | ICD-10-CM

## 2024-12-04 DIAGNOSIS — M20.12 HALLUX ABDUCTOVALGUS, LEFT: ICD-10-CM

## 2024-12-04 DIAGNOSIS — M21.41 PES PLANUS OF BOTH FEET: ICD-10-CM

## 2024-12-04 PROCEDURE — 99203 OFFICE O/P NEW LOW 30 MIN: CPT | Performed by: PODIATRIST

## 2024-12-04 NOTE — PATIENT INSTRUCTIONS
Thank you for choosing [facility]!    DR. BRIGGS'S CLINIC LOCATIONS:     [facility] TRIAGE LINE: [phone]   [address] APPOINTMENTS: [phone]   [place] [address] RADIOLOGY: [phone]   (Every other Tues - Wed - Fri PM) SET UP SURGERY: [phone]    PHYSICAL THERAPY: [phone]   [place] SPECIALTY BILLING QUESTIONS: [phone] [address] FAX: [phone]   [place] [address]    (Thurs & Fri AM)         BUNION (HALLUX ABDUCTO VALGUS)  A bunion is caused by muscle imbalance. The great toe is pulled toward the smaller toes. The metatarsal head is pushed outward creating a lump on the side of your foot. Imbalance is the result of foot structure and instability.   Bunions do not improve with time. They usually enlarge, however this is a fairly slow process. Shoes do not necessarily cause bunions, however, they can hasten development and definitely cause bunions to hurt.   Bunions often run in families. We inherit a certain foot structure, which may be predisposed to bunion development.   Bunion pain is likely a combination of shoes rubbing on the bump, nerve irritation, compression between the toes, joint misalignment, arthritis and altered gait.   SYMPTOMS   Bunions are usually termed mild, moderate or severe. Just because you have a bunion does not mean you have to have pain. There are some people with very severe bunions and no pain and people with mild bunions and a lot of pain.   - Pain on the inside of your foot at the big toe joint (1st MTPJ)   - Swelling on the inside of your foot at the big toe joint   - Redness on the inside of your foot at the big toe joint   - Numbness or burning in the big toe (hallux)   - Decreased motion at the big toe joint   - Painful bursa (fluid-filled sac) on the inside of your foot at the big toe joint   - Pain while wearing shoes -especially shoes too narrow or with high heels    - Pain  during activities   - Corn in between the big toe and second toe   - Callous formation on the side or bottom of the big toe or big toe joint   - Callous under the second toe joint (2nd MTPJ)   - Pain in the second toe joint   TREATMENT  Conservative (non-surgical) treatment will not make the bunion go away, but it will hopefully decrease the signs and symptoms you have and help you get rid of the pain and get you back to your activities.   1.  Wider shoes or extra depth shoes: Most bunion pain can be improved simply by wearing compatible shoes. People with bunions cannot choose footwear simply because they like the style. Your bunion should determine which shoes are to be worn. Wide shoes with nonirritating seams,soft leather and a square toe box are most compatible with a bunion. Shoes should fit appropriately right out of the box but may need to be professionally stretched and modified to accommodate the bump. Heels, dress shoes and shoes with pointed toes will not be comfortable.   2. NSAIDs   3.  Arch supports, custom inserts, padding, splints, toe spacers : Most bunion pain can be improved simply by wearing compatible shoes. People with bunions cannot choose footwear simply because they like the style. Your bunion should determine which shoes are to be worn. Wide shoes with nonirritating seams,soft leather and a square toe box are most compatible with a bunion. Shoes should fit appropriately right out of the box but may need to be professionally stretched and modified to accommodate the bump. Heels, dress shoes and shoes with pointed toes will not be comfortable.   4.  Change activities   5.  Physical therapy  SURGERY  Surgical treatment for bunions is sometimes needed. If you are limited by pain, cannot fit in shoes comfortably and are not able to do your daily activities then surgery may be a good option for you. There are many different surgical procedures to repair bunions. Your foot and ankle surgeon will  review your foot exam findings, your x-rays, your age, your health, your lifestyle, your physical activity level and discuss with you which procedure he or she would recommend. Surgical procedures for bunions range from soft tissue repair to cutting and realigning the bones. It is not recommended that you have bunion surgery for cosmetic reasons (you do not like how your foot looks) or because you want to fit in a certain pair of shoes; There is the risk that even after surgery, the bunion will reoccur 9-10% of the time.   Bunion surgery involves cutting and repositioning the bones surrounding the bunion. Pins and screws are used to hold the bones in place during the healing process. The goal of bunion surgery is to reduce the size of the bunion bump. Realignment of the toe and joint is attempted.     Some first toes cannot be forced back into normal alignment even with surgery. Surgery is helpful in most cases but does not necessarily create a normal foot.   Healing after surgery requires about six weeks of protection. This allows the bone to heal. Maximum recovery takes about one year. The scar tissue and jOint structures require this amount of time to finish the healing process. Expect stiffness, swelling and numbness during that time frame. Bunion surgery does involve side effects. Some side effects are predictable and others are less common but do occur. A scar will be visible and could be irritated by shoes. The shoe may rub on the screw or internal pin requiring surgical removal of these fixation devices. The screw and pin would likely be left in place for a full year. The first toe may loose motion after bunion surgery. The amount of stiffness is variable. Some people never regain normal motion of the first toe. This is due to scar tissue inherent to any surgery. The first toe may drift toward the second toe or away from the second toe. Spreading of the first and second toes is a rare occurrence after bunion  surgery. This can be quite bothersome and would need to be surgically repaired. Toe drift toward the second toe could result in a recurrent bunion and revision surgery. Joint fusion is one option to correct an unstable, drifting toe. This procedure straightens the toe, however, no motion remains. Fusion may be necessary to correct complications of bunion surgery or as the original procedure in severe cases.   All surgical procedures involve risk of infection, numbness, pain, delayed healing, osteotomy dislocation, blood clots, continued foot pain, etc. Bunion surgery is quite complex and should not be taken lightly.   Any skin incision can lead to infection. Deep infection might involve the bone and thus repeat surgery and six weeks of IV antibiotics. Scar tissue can cause nerve pain or numbness. This is generally temporary but can be permanent. We do not have treatments that cure nerve problems. Second toe pain could be related to altered mechanics and pressure transferred to the second toe. Most feet with bunions have pre-existing second toe problems. Delayed bone healing would lengthen the healing time. Some bones simply do not heal. This requires repeat surgery, electronic bone stimulation and/or extended protection. Smokers have an approximate 20% chance of poor bone healing. This is double that of a non-smoker. The bone cut may displace. This may need to be repaired with a second operation. Displacement can cause jOint malalignment. Immobility after surgery can cause blood clots in the legs and lungs. This could result in death.   Foot pain is complex. Most feet hurt for more than one reason. Fixing the bunion would not necessarily create a pain free foot. Appropriate shoes, healthy body weight, avoidance of bare foot walking and moderation of activity will always be necessary to enjoy foot comfort. Your bunion may involve arthritis, which is incurable even with surgery. Long standing bunions often involve  chronic irritation to the surrounding nerves. Nerve pain may not resolve even with reducing the bunion bump since permanent nerve damage may be present   Bunion surgery is nevertheless quite successful. Most surgical patients are pleased with their foot following bunion surgery. Many of the issues described above can be controlled by taking proper care of your foot during the healing process.   Your surgeon would be happy to fully describe any of the above issues. You should pursue a full understanding of the operation,recovery process and any potential problems that could develop.   PREVENTION  1.  Do not wear high heels if there is a family history of bunions.  2.  Wear shoes that have enough width and depth in the toe box  Here are exercises that may benefit people with bunions:   Toe stretches - Stretching out your toes can help keep them limber and offset foot pain. To stretch your toes, point your toes straight ahead for 5 seconds and then curl them under for 5 seconds. Repeat these stretches 10 times. These exercises can be especially beneficial if you also have hammertoes, or chronically bent toes, in addition to a bunion.   Toe flexing and dulce maria - Press your toes against a hard surface such as a wall, to flex and stretch them; hold the position for 10 seconds and repeat three to four times. Then flex your toes in the opposite direction; hold the position for 10 seconds and repeat three to four times.   Stretching your big toe - Using your fingers to gently pull your big toe over into proper alignment can be helpful as well. Hold your toe in position for 10 seconds and repeat three to four times.   Resistance exercises - Wrap either a towel or belt around your big toe and use it to pull your big toe toward you while simultaneously pushing forward, against the towel, with your big toe.   Ball roll - To massage the bottom of your foot, sit down, place a golf ball on the floor under your foot, and roll it  around under your foot for two minutes. This can help relieve foot strain and cramping.   Towel curls - You can strengthen your toes by spreading out a small towel on the floor, curling your toes around it, and pulling it toward you. Repeat five times. Gripping objects with your toes like this can help keep your foot flexible.   Picking up marbles - Another gripping exercise you can perform to keep your foot flexible is picking up marbles with your toes. Do this by placing 20 marbles on the floor in front of you and use your foot to pick the marbles up one by one and place them in a bowl.   Walking along the beach - Whenever possible, spend time walking on sand. This can give you a gentle foot massage and also help strengthen your toes. This is especially beneficial for people who have arthritis associated with their bunions.    [facility] LOCATIONS  [facility]  [address]  [place] [address]  Phone: [phone]  Fax: [phone] [facility]   [address]  [place] [address]  Phone: [phone]  Fax: [phone]   [facility] [place]  [address]  [place] [address]  Phone: [phone]  Fax: [phone] [facility]  [address]  [place] [address]  Phone: [phone]   Fax: [phone]   * Please call any location listed to make an appointment for a casting/fitting. Your referral was sent to their central office and they will all have the order on file.

## 2024-12-04 NOTE — PROGRESS NOTES
"ASSESSMENT:  Encounter Diagnoses   Name Primary?    Hallux abductovalgus, right Yes    Hallux abductovalgus, left     Pes planus of both feet     Bunion, left     Melanoma of skin (H)     Lymphedema      MEDICAL DECISION MAKING:  Conservative cares were reviewed including improved foot support, proper shoes, anti-inflammatory measures, padding, and the avoidance of barefoot walking.  An informational handout on bunions provided.      She is referred for new custom molded orthoses.  We also discussed a trial of quality over-the-counter devices.  I explained that good support can help prevent or slow any progression.    Surgical intervention was also discussed.  I discussed both the distal metatarsal osteotomy and the midfoot fusion with simple bunionectomy options.    In her case, conservative management is best.  It would be very difficult to manage the left lower extremity lymphedema postoperatively.    All questions were answered.      Disclaimer: This note consists of symbols derived from keyboarding, dictation and/or voice recognition software. As a result, there may be errors in the script that have gone undetected. Please consider this when interpreting information found in this chart.    Cam Damian DPM, FACFAS, MS    [facility] of Pod[facility]/Foot & Ankle Surgery      ____________________________________________________________________    HPI:       I was asked by Dr. Samira De Paz to evaluate Milagros Eric in consultation for a left foot bunion.       Milagros presents today for evaluation of bilateral bunions, for \"bunion management.  \"  She has aching discomfort that can be rated a 7 out of 10 at worst.  Pain comes and goes  More pain with weightbearing  She has used orthoses in the past  She works in commercial real estate  Exercise includes swimming, biking, walking and strength training.    *  Past Medical History:   Diagnosis Date    ADHD (attention deficit hyperactivity disorder)     " Anxiety     PMS (premenstrual syndrome)     Recurrent UTI     Scoliosis    *  *  Past Surgical History:   Procedure Laterality Date    scoliosis surgery  1993   *  *  Current Outpatient Medications   Medication Sig Dispense Refill    ACIDOPHILUS LACTOBACILLUS PO Take 1 capsule by mouth daily      adapalene (DIFFERIN) 0.3 % external gel Apply topically 2 times daily      azelaic acid (FINACIA) 15 % external gel APPLY TOPICALLY TO FACE EVERY DAY      Calcium-Magnesium-Vitamin D (CALCIUM MAGNESIUM PO) Take by mouth daily      clindamycin (CLEOCIN T) 1 % external lotion APPLY TOPICALLY TO FACE 1 TO 2 TIMES DAILY      cyanocobalamin (B-12) 100 MCG TABS Take 1,000 mcg by mouth daily.      escitalopram (LEXAPRO) 5 MG tablet Take 1 tablet by mouth daily      ferrous sulfate (SUMMER-IN-SOL) 75 (15 FE) MG/ML oral drops Take by mouth.      fish oil-omega-3 fatty acids (FISH OIL) 1000 MG capsule Take 2 g by mouth daily.      levonorgestrel-ethinyl estradiol (AVIANE) 0.1-20 MG-MCG tablet Take 1 tablet by mouth      mometasone (ELOCON) 0.1 % external solution Apply BID PRN to bug bites.  1 week MAX      MULTIPLE VITAMIN PO Take 1 tablet by mouth daily.      vitamin (B COMPLEX) capsule Take 1 capsule by mouth daily           EXAM:    Vitals: Wt 55.8 kg (123 lb)   BMI 20.47 kg/m    BMI: Body mass index is 20.47 kg/m .    Constitutional:  Milagros Eric is in no apparent distress, appears well-nourished.  Cooperative with history and physical exam.    Vascular:  Pedal pulses are palpable for both the DP and PT arteries.  CFT < 3 sec.  No edema.      Neuro: Light touch sensation is intact to the L4, L5, S1 distributions  No evidence of weakness, spasticity, or contracture in the lower extremities.     Derm: There are some erythema over the bunion bump on the left.  No skin breakdown.  No concerning lesions.    Musculoskeletal:    Lower extremity muscle strength is normal.  Decrease in medial longitudinal arch with weightbearing.   Bilateral hallux abductovalgus.  The bilateral hallux is reducible in the transverse plane with some preserved sagittal plane range of motion.

## 2024-12-04 NOTE — LETTER
"12/4/2024      Milagros Eric  [address]  [facility] [address]      Dear Colleague,    Thank you for referring your patient, Milagros Eric, to the [facility]. Please see a copy of my visit note below.    ASSESSMENT:  Encounter Diagnoses   Name Primary?     Hallux abductovalgus, right Yes     Hallux abductovalgus, left      Pes planus of both feet      Bunion, left      Melanoma of skin (H)      Lymphedema      MEDICAL DECISION MAKING:  Conservative cares were reviewed including improved foot support, proper shoes, anti-inflammatory measures, padding, and the avoidance of barefoot walking.  An informational handout on bunions provided.      She is referred for new custom molded orthoses.  We also discussed a trial of quality over-the-counter devices.  I explained that good support can help prevent or slow any progression.    Surgical intervention was also discussed.  I discussed both the distal metatarsal osteotomy and the midfoot fusion with simple bunionectomy options.    In her case, conservative management is best.  It would be very difficult to manage the left lower extremity lymphedema postoperatively.    All questions were answered.      Disclaimer: This note consists of symbols derived from keyboarding, dictation and/or voice recognition software. As a result, there may be errors in the script that have gone undetected. Please consider this when interpreting information found in this chart.    Cam Damian DPM, FACFAS, [facility] Department of Podiatry/Foot & Ankle Surgery      ____________________________________________________________________    HPI:       I was asked by Dr. Samira De Paz to evaluate Milagros Eric in consultation for a left foot bunion.       Milagros presents today for evaluation of bilateral bunions, for \"bunion management.  \"  She has aching discomfort that can be rated a 7 out of 10 at worst.  Pain comes and goes  More pain " with weightbearing  She has used orthoses in the past  She works in commercial real estate  Exercise includes swimming, biking, walking and strength training.    *  Past Medical History:   Diagnosis Date     ADHD (attention deficit hyperactivity disorder)      Anxiety      PMS (premenstrual syndrome)      Recurrent UTI      Scoliosis    *  *  Past Surgical History:   Procedure Laterality Date     scoliosis surgery  1993   *  *  Current Outpatient Medications   Medication Sig Dispense Refill     ACIDOPHILUS LACTOBACILLUS PO Take 1 capsule by mouth daily       adapalene (DIFFERIN) 0.3 % external gel Apply topically 2 times daily       azelaic acid (FINACIA) 15 % external gel APPLY TOPICALLY TO FACE EVERY DAY       Calcium-Magnesium-Vitamin D (CALCIUM MAGNESIUM PO) Take by mouth daily       clindamycin (CLEOCIN T) 1 % external lotion APPLY TOPICALLY TO FACE 1 TO 2 TIMES DAILY       cyanocobalamin (B-12) 100 MCG TABS Take 1,000 mcg by mouth daily.       escitalopram (LEXAPRO) 5 MG tablet Take 1 tablet by mouth daily       ferrous sulfate (SUMMER-IN-SOL) 75 (15 FE) MG/ML oral drops Take by mouth.       fish oil-omega-3 fatty acids (FISH OIL) 1000 MG capsule Take 2 g by mouth daily.       levonorgestrel-ethinyl estradiol (AVIANE) 0.1-20 MG-MCG tablet Take 1 tablet by mouth       mometasone (ELOCON) 0.1 % external solution Apply BID PRN to bug bites.  1 week MAX       MULTIPLE VITAMIN PO Take 1 tablet by mouth daily.       vitamin (B COMPLEX) capsule Take 1 capsule by mouth daily           EXAM:    Vitals: Wt 55.8 kg (123 lb)   BMI 20.47 kg/m    BMI: Body mass index is 20.47 kg/m .    Constitutional:  Milagros Eric is in no apparent distress, appears well-nourished.  Cooperative with history and physical exam.    Vascular:  Pedal pulses are palpable for both the DP and PT arteries.  CFT < 3 sec.  No edema.      Neuro: Light touch sensation is intact to the L4, L5, S1 distributions  No evidence of weakness, spasticity, or  contracture in the lower extremities.     Derm: There are some erythema over the bunion bump on the left.  No skin breakdown.  No concerning lesions.    Musculoskeletal:    Lower extremity muscle strength is normal.  Decrease in medial longitudinal arch with weightbearing.  Bilateral hallux abductovalgus.  The bilateral hallux is reducible in the transverse plane with some preserved sagittal plane range of motion.        Again, thank you for allowing me to participate in the care of your patient.        Sincerely,        REJI HuffM